# Patient Record
Sex: MALE | Race: WHITE | NOT HISPANIC OR LATINO | Employment: FULL TIME | URBAN - METROPOLITAN AREA
[De-identification: names, ages, dates, MRNs, and addresses within clinical notes are randomized per-mention and may not be internally consistent; named-entity substitution may affect disease eponyms.]

---

## 2020-11-04 ENCOUNTER — OFFICE VISIT (OUTPATIENT)
Dept: URGENT CARE | Facility: CLINIC | Age: 35
End: 2020-11-04
Payer: COMMERCIAL

## 2020-11-04 ENCOUNTER — HOSPITAL ENCOUNTER (OUTPATIENT)
Facility: MEDICAL CENTER | Age: 35
End: 2020-11-04
Attending: PHYSICIAN ASSISTANT
Payer: COMMERCIAL

## 2020-11-04 VITALS
BODY MASS INDEX: 23.49 KG/M2 | WEIGHT: 155 LBS | RESPIRATION RATE: 16 BRPM | TEMPERATURE: 98.4 F | HEART RATE: 74 BPM | DIASTOLIC BLOOD PRESSURE: 82 MMHG | HEIGHT: 68 IN | OXYGEN SATURATION: 96 % | SYSTOLIC BLOOD PRESSURE: 114 MMHG

## 2020-11-04 DIAGNOSIS — J02.9 PHARYNGITIS, UNSPECIFIED ETIOLOGY: Primary | ICD-10-CM

## 2020-11-04 DIAGNOSIS — Z20.822 CLOSE EXPOSURE TO COVID-19 VIRUS: ICD-10-CM

## 2020-11-04 LAB
INT CON NEG: NORMAL
INT CON POS: NORMAL
S PYO AG THROAT QL: NEGATIVE

## 2020-11-04 PROCEDURE — 87880 STREP A ASSAY W/OPTIC: CPT | Performed by: PHYSICIAN ASSISTANT

## 2020-11-04 PROCEDURE — 99204 OFFICE O/P NEW MOD 45 MIN: CPT | Mod: CS | Performed by: PHYSICIAN ASSISTANT

## 2020-11-04 PROCEDURE — U0003 INFECTIOUS AGENT DETECTION BY NUCLEIC ACID (DNA OR RNA); SEVERE ACUTE RESPIRATORY SYNDROME CORONAVIRUS 2 (SARS-COV-2) (CORONAVIRUS DISEASE [COVID-19]), AMPLIFIED PROBE TECHNIQUE, MAKING USE OF HIGH THROUGHPUT TECHNOLOGIES AS DESCRIBED BY CMS-2020-01-R: HCPCS

## 2020-11-04 RX ORDER — METHYLPREDNISOLONE 4 MG/1
TABLET ORAL
Qty: 21 TAB | Refills: 0 | Status: SHIPPED | OUTPATIENT
Start: 2020-11-04 | End: 2022-02-15

## 2020-11-04 NOTE — PATIENT INSTRUCTIONS
COVID-19  COVID-19 is a respiratory infection that is caused by a virus called severe acute respiratory syndrome coronavirus 2 (SARS-CoV-2). The disease is also known as coronavirus disease or novel coronavirus. In some people, the virus may not cause any symptoms. In others, it may cause a serious infection. The infection can get worse quickly and can lead to complications, such as:  · Pneumonia, or infection of the lungs.  · Acute respiratory distress syndrome or ARDS. This is fluid build-up in the lungs.  · Acute respiratory failure. This is a condition in which there is not enough oxygen passing from the lungs to the body.  · Sepsis or septic shock. This is a serious bodily reaction to an infection.  · Blood clotting problems.  · Secondary infections due to bacteria or fungus.  The virus that causes COVID-19 is contagious. This means that it can spread from person to person through droplets from coughs and sneezes (respiratory secretions).  What are the causes?  This illness is caused by a virus. You may catch the virus by:  · Breathing in droplets from an infected person's cough or sneeze.  · Touching something, like a table or a doorknob, that was exposed to the virus (contaminated) and then touching your mouth, nose, or eyes.  What increases the risk?  Risk for infection  You are more likely to be infected with this virus if you:  · Live in or travel to an area with a COVID-19 outbreak.  · Come in contact with a sick person who recently traveled to an area with a COVID-19 outbreak.  · Provide care for or live with a person who is infected with COVID-19.  Risk for serious illness  You are more likely to become seriously ill from the virus if you:  · Are 65 years of age or older.  · Have a long-term disease that lowers your body's ability to fight infection (immunocompromised).  · Live in a nursing home or long-term care facility.  · Have a long-term (chronic) disease such as:  ? Chronic lung disease, including  chronic obstructive pulmonary disease or asthma  ? Heart disease.  ? Diabetes.  ? Chronic kidney disease.  ? Liver disease.  · Are obese.  What are the signs or symptoms?  Symptoms of this condition can range from mild to severe. Symptoms may appear any time from 2 to 14 days after being exposed to the virus. They include:  · A fever.  · A cough.  · Difficulty breathing.  · Chills.  · Muscle pains.  · A sore throat.  · Loss of taste or smell.  Some people may also have stomach problems, such as nausea, vomiting, or diarrhea.  Other people may not have any symptoms of COVID-19.  How is this diagnosed?  This condition may be diagnosed based on:  · Your signs and symptoms, especially if:  ? You live in an area with a COVID-19 outbreak.  ? You recently traveled to or from an area where the virus is common.  ? You provide care for or live with a person who was diagnosed with COVID-19.  · A physical exam.  · Lab tests, which may include:  ? A nasal swab to take a sample of fluid from your nose.  ? A throat swab to take a sample of fluid from your throat.  ? A sample of mucus from your lungs (sputum).  ? Blood tests.  · Imaging tests, which may include, X-rays, CT scan, or ultrasound.  How is this treated?  At present, there is no medicine to treat COVID-19. Medicines that treat other diseases are being used on a trial basis to see if they are effective against COVID-19.  Your health care provider will talk with you about ways to treat your symptoms. For most people, the infection is mild and can be managed at home with rest, fluids, and over-the-counter medicines.  Treatment for a serious infection usually takes places in a hospital intensive care unit (ICU). It may include one or more of the following treatments. These treatments are given until your symptoms improve.  · Receiving fluids and medicines through an IV.  · Supplemental oxygen. Extra oxygen is given through a tube in the nose, a face mask, or a  chang.  · Positioning you to lie on your stomach (prone position). This makes it easier for oxygen to get into the lungs.  · Continuous positive airway pressure (CPAP) or bi-level positive airway pressure (BPAP) machine. This treatment uses mild air pressure to keep the airways open. A tube that is connected to a motor delivers oxygen to the body.  · Ventilator. This treatment moves air into and out of the lungs by using a tube that is placed in your windpipe.  · Tracheostomy. This is a procedure to create a hole in the neck so that a breathing tube can be inserted.  · Extracorporeal membrane oxygenation (ECMO). This procedure gives the lungs a chance to recover by taking over the functions of the heart and lungs. It supplies oxygen to the body and removes carbon dioxide.  Follow these instructions at home:  Lifestyle  · If you are sick, stay home except to get medical care. Your health care provider will tell you how long to stay home. Call your health care provider before you go for medical care.  · Rest at home as told by your health care provider.  · Do not use any products that contain nicotine or tobacco, such as cigarettes, e-cigarettes, and chewing tobacco. If you need help quitting, ask your health care provider.  · Return to your normal activities as told by your health care provider. Ask your health care provider what activities are safe for you.  General instructions  · Take over-the-counter and prescription medicines only as told by your health care provider.  · Drink enough fluid to keep your urine pale yellow.  · Keep all follow-up visits as told by your health care provider. This is important.  How is this prevented?    There is no vaccine to help prevent COVID-19 infection. However, there are steps you can take to protect yourself and others from this virus.  To protect yourself:   · Do not travel to areas where COVID-19 is a risk. The areas where COVID-19 is reported change often. To identify  high-risk areas and travel restrictions, check the [facility] travel website: wwwnc.cdc.gov/travel/notices  · If you live in, or must travel to, an area where COVID-19 is a risk, take precautions to avoid infection.  ? Stay away from people who are sick.  ? Wash your hands often with soap and water for 20 seconds. If soap and water are not available, use an alcohol-based hand .  ? Avoid touching your mouth, face, eyes, or nose.  ? Avoid going out in public, follow guidance from your state and local health authorities.  ? If you must go out in public, wear a cloth face covering or face mask.  ? Disinfect objects and surfaces that are frequently touched every day. This may include:  § Counters and tables.  § Doorknobs and light switches.  § Sinks and faucets.  § Electronics, such as phones, remote controls, keyboards, computers, and tablets.  To protect others:  If you have symptoms of COVID-19, take steps to prevent the virus from spreading to others.  · If you think you have a COVID-19 infection, contact your health care provider right away. Tell your health care team that you think you may have a COVID-19 infection.  · Stay home. Leave your house only to seek medical care. Do not use public transport.  · Do not travel while you are sick.  · Wash your hands often with soap and water for 20 seconds. If soap and water are not available, use alcohol-based hand .  · Stay away from other members of your household. Let healthy household members care for children and pets, if possible. If you have to care for children or pets, wash your hands often and wear a mask. If possible, stay in your own room, separate from others. Use a different bathroom.  · Make sure that all people in your household wash their hands well and often.  · Cough or sneeze into a tissue or your sleeve or elbow. Do not cough or sneeze into your hand or into the air.  · Wear a cloth face covering or face mask.  Where to find more  information  · Centers for Disease Control and Prevention: www.cdc.gov/coronavirus/2019-ncov/index.html  · World Health Organization: www.who.int/health-topics/coronavirus  Contact a health care provider if:  · You live in or have traveled to an area where COVID-19 is a risk and you have symptoms of the infection.  · You have had contact with someone who has COVID-19 and you have symptoms of the infection.  Get help right away if:  · You have trouble breathing.  · You have pain or pressure in your chest.  · You have confusion.  · You have bluish lips and fingernails.  · You have difficulty waking from sleep.  · You have symptoms that get worse.  These symptoms may represent a serious problem that is an emergency. Do not wait to see if the symptoms will go away. Get medical help right away. Call your local emergency services (911 in the [place] Do not drive yourself to the hospital. Let the emergency medical personnel know if you think you have COVID-19.  Summary  · COVID-19 is a respiratory infection that is caused by a virus. It is also known as coronavirus disease or novel coronavirus. It can cause serious infections, such as pneumonia, acute respiratory distress syndrome, acute respiratory failure, or sepsis.  · The virus that causes COVID-19 is contagious. This means that it can spread from person to person through droplets from coughs and sneezes.  · You are more likely to develop a serious illness if you are 65 years of age or older, have a weak immunity, live in a nursing home, or have chronic disease.  · There is no medicine to treat COVID-19. Your health care provider will talk with you about ways to treat your symptoms.  · Take steps to protect yourself and others from infection. Wash your hands often and disinfect objects and surfaces that are frequently touched every day. Stay away from people who are sick and wear a mask if you are sick.  This information is not intended to replace advice given to you by  your health care provider. Make sure you discuss any questions you have with your health care provider.  Document Released: 01/23/2020 Document Revised: 05/14/2020 Document Reviewed: 01/23/2020  Elsevier Patient Education © 2020 [employer]

## 2020-11-04 NOTE — PROGRESS NOTES
Subjective:      Pt is a 35 y.o. male who presents with Sore Throat (x 1 week, fatigue, diarrhea, heavy chest, headache )            HPI  This is a new problem. Pt notes sore throat, fatigue, episodes of diarrhea, mild SOB and headache x 7 days with COVID exposure to someone he hung out with for 3 days about 2 weeks ago. Pt has not taken any Rx medications for this condition. Pt states the pain is a 3/10, aching in nature and worse at night. Pt denies CP, SOB, NVD, paresthesias, headaches, dizziness, change in vision, hives, or other joint pain. The pt's medication list, problem list, and allergies have been evaluated and reviewed during today's visit.    PMH:  Negative per pt.      PSH:  Past Surgical History:   Procedure Laterality Date   • OTHER      facial surgery       Fam Hx:  the patient's family history is not pertinent to their current complaint      Soc HX:  Social History     Socioeconomic History   • Marital status: Single     Spouse name: Not on file   • Number of children: Not on file   • Years of education: Not on file   • Highest education level: Not on file   Occupational History   • Not on file   Social Needs   • Financial resource strain: Not on file   • Food insecurity     Worry: Not on file     Inability: Not on file   • Transportation needs     Medical: Not on file     Non-medical: Not on file   Tobacco Use   • Smoking status: Never Smoker   • Smokeless tobacco: Never Used   Substance and Sexual Activity   • Alcohol use: Yes     Comment: occ   • Drug use: No   • Sexual activity: Not on file   Lifestyle   • Physical activity     Days per week: Not on file     Minutes per session: Not on file   • Stress: Not on file   Relationships   • Social connections     Talks on phone: Not on file     Gets together: Not on file     Attends Yazdanism service: Not on file     Active member of club or organization: Not on file     Attends meetings of clubs or organizations: Not on file     Relationship status:  "Not on file   • Intimate partner violence     Fear of current or ex partner: Not on file     Emotionally abused: Not on file     Physically abused: Not on file     Forced sexual activity: Not on file   Other Topics Concern   • Not on file   Social History Narrative   • Not on file         Medications:    Current Outpatient Medications:   •  methylPREDNISolone (MEDROL DOSEPAK) 4 MG Tablet Therapy Pack, Follow schedule on package instructions., Disp: 21 Tab, Rfl: 0      Allergies:  Nkda [no known drug allergy]    ROS  Constitutional: Negative for fever, chills and +malaise/fatigue.   HENT: Negative for congestion and +sore throat.    Eyes: Negative for blurred vision, double vision and photophobia.   Respiratory: Negative for cough and shortness of breath.  Cardiovascular: Negative for chest pain and palpitations.   Gastrointestinal: Negative for heartburn, nausea, vomiting, abdominal pain, and constipation. +diarrhea  Genitourinary: Negative for dysuria and flank pain.   Musculoskeletal: Negative for joint pain and myalgias.   Skin: Negative for itching and rash.   Neurological: Negative for dizziness, tingling and +headaches.   Endo/Heme/Allergies: Does not bruise/bleed easily.   Psychiatric/Behavioral: Negative for depression. The patient is not nervous/anxious.           Objective:     /82   Pulse 74   Temp 36.9 °C (98.4 °F) (Temporal)   Resp 16   Ht 1.727 m (5' 8\")   Wt 70.3 kg (155 lb)   SpO2 96%   BMI 23.57 kg/m²      Physical Exam        Physical Exam   Constitutional: Pt is oriented to person, place, and time. Pt appears well-developed and well-nourished. No distress.   HENT:   Head: Normocephalic and atraumatic.   Right Ear: Hearing, tympanic membrane, external ear and ear canal normal.   Left Ear: Hearing, tympanic membrane, external ear and ear canal normal.   Nose: Mucosal edema, rhinorrhea and sinus tenderness present. No nose lacerations, nasal deformity, septal deviation or nasal septal " hematoma. No epistaxis.  No foreign bodies. Right sinus exhibits maxillary sinus tenderness and frontal sinus tenderness. Left sinus exhibits maxillary sinus tenderness and frontal sinus tenderness.   Mouth/Throat: Oropharynx is clear and moist. No oropharyngeal exudate.   Eyes: Conjunctivae normal and EOM are normal. Pupils are equal, round, and reactive to light. Right eye exhibits no discharge. Left eye exhibits no discharge.   Neck: Normal range of motion. Neck supple. No tracheal deviation present. No thyromegaly present.   Cardiovascular: Normal rate, regular rhythm, normal heart sounds and intact distal pulses.  Exam reveals no gallop and no friction rub.    No murmur heard.  Pulmonary/Chest: Effort normal and breath sounds normal. No respiratory distress. Pt has no wheezes. Pt has no rales. Pt exhibits no tenderness.   Abdominal: Soft. Bowel sounds are normal. Pt exhibits no distension and no mass. There is no tenderness. There is no rebound and no guarding.   Musculoskeletal: Normal range of motion. Pt exhibits no edema and no tenderness.   Lymphadenopathy:     Pt has no cervical adenopathy.   Neurological: Pt is alert and oriented to person, place, and time. Pt has normal reflexes. Pt displays normal reflexes. No cranial nerve deficit. Pt exhibits normal muscle tone. Coordination normal.   Skin: Skin is warm and dry. No rash noted. No erythema.   Psychiatric: Pt has a normal mood and affect. Pt behavior is normal. Judgment and thought content normal.            Assessment/Plan:        1. Pharyngitis, unspecified etiology    - POCT Rapid Strep A-->NEG  - methylPREDNISolone (MEDROL DOSEPAK) 4 MG Tablet Therapy Pack; Follow schedule on package instructions.  Dispense: 21 Tab; Refill: 0    2. Close exposure to COVID-19 virus    - COVID/SARS COV-2 PCR; Future  - methylPREDNISolone (MEDROL DOSEPAK) 4 MG Tablet Therapy Pack; Follow schedule on package instructions.  Dispense: 21 Tab; Refill: 0    As per COVID-19  Protocol:  Pt was escorted by gm CHEEK with a mask on, from their car, to an isolation room. I, as the provider, donned all appropriate, N95 mask, gloves and apparel and performed the complete exam.  Once the POC testing for STREP was determined to be NEGATIVE:  RESP COVID-19 lab placed.  Pt was instructed by me to go home and remain in quarantine with hand hygiene and airborne precautions discussed.  Pt was in full agreement and understanding of the plan and all questions were answered to the full satisfaction of the pt.  Rest, fluids encouraged.  OTC decongestant for congestion/cough  Note given for work.  AVS with medical info given.  Pt was in full understanding and agreement with the plan.  Differential diagnosis, natural history, supportive care, and indications for immediate follow-up discussed. All questions answered. Patient agrees with the plan of care.  Follow-up as needed if symptoms worsen or fail to improve to PCP, Urgent care or Emergency Room.

## 2020-11-05 DIAGNOSIS — Z20.822 CLOSE EXPOSURE TO COVID-19 VIRUS: ICD-10-CM

## 2020-11-05 LAB
COVID ORDER STATUS COVID19: NORMAL
SARS-COV-2 RNA RESP QL NAA+PROBE: NOTDETECTED
SPECIMEN SOURCE: NORMAL

## 2020-11-06 ENCOUNTER — TELEPHONE (OUTPATIENT)
Dept: URGENT CARE | Facility: CLINIC | Age: 35
End: 2020-11-06

## 2020-11-06 NOTE — TELEPHONE ENCOUNTER
Called and spoke with pt about COVID culture results which came back NEG.  Encouraged continuing the quarantine and monitor respiratory status.       Bradley Mckeon PA-C

## 2022-02-15 ENCOUNTER — OFFICE VISIT (OUTPATIENT)
Dept: MEDICAL GROUP | Facility: MEDICAL CENTER | Age: 37
End: 2022-02-15
Payer: COMMERCIAL

## 2022-02-15 VITALS
OXYGEN SATURATION: 96 % | HEART RATE: 73 BPM | TEMPERATURE: 97.6 F | SYSTOLIC BLOOD PRESSURE: 124 MMHG | WEIGHT: 152.78 LBS | DIASTOLIC BLOOD PRESSURE: 68 MMHG | BODY MASS INDEX: 23.23 KG/M2

## 2022-02-15 DIAGNOSIS — Z13.6 ENCOUNTER FOR LIPID SCREENING FOR CARDIOVASCULAR DISEASE: ICD-10-CM

## 2022-02-15 DIAGNOSIS — Z13.21 ENCOUNTER FOR VITAMIN DEFICIENCY SCREENING: ICD-10-CM

## 2022-02-15 DIAGNOSIS — Z13.1 SCREENING FOR DIABETES MELLITUS: ICD-10-CM

## 2022-02-15 DIAGNOSIS — R25.2 MUSCLE CRAMP: ICD-10-CM

## 2022-02-15 DIAGNOSIS — Z12.5 SCREENING FOR PROSTATE CANCER: ICD-10-CM

## 2022-02-15 DIAGNOSIS — Z23 IMMUNIZATION DUE: ICD-10-CM

## 2022-02-15 DIAGNOSIS — Z13.220 ENCOUNTER FOR LIPID SCREENING FOR CARDIOVASCULAR DISEASE: ICD-10-CM

## 2022-02-15 DIAGNOSIS — Z13.0 SCREENING FOR DEFICIENCY ANEMIA: ICD-10-CM

## 2022-02-15 PROCEDURE — 99213 OFFICE O/P EST LOW 20 MIN: CPT | Performed by: FAMILY MEDICINE

## 2022-02-15 RX ORDER — ALBUTEROL SULFATE 90 UG/1
AEROSOL, METERED RESPIRATORY (INHALATION)
COMMUNITY
Start: 2021-12-20 | End: 2022-02-15

## 2022-02-15 ASSESSMENT — ENCOUNTER SYMPTOMS
CONSTIPATION: 0
DIARRHEA: 0
COUGH: 0
WEIGHT LOSS: 0
SORE THROAT: 0
FEVER: 0
VOMITING: 0
CHILLS: 0
BLURRED VISION: 0
DEPRESSION: 0
WEAKNESS: 0
NAUSEA: 0
BRUISES/BLEEDS EASILY: 0
ABDOMINAL PAIN: 0
NERVOUS/ANXIOUS: 0
DIZZINESS: 0
TINGLING: 0
SHORTNESS OF BREATH: 0
DOUBLE VISION: 0
MYALGIAS: 1
PALPITATIONS: 0

## 2022-02-15 ASSESSMENT — PATIENT HEALTH QUESTIONNAIRE - PHQ9: CLINICAL INTERPRETATION OF PHQ2 SCORE: 0

## 2022-02-15 NOTE — PROGRESS NOTES
Malcolm Ramey is a pleasant 36 y.o. male here to establish care.    HPI:   Problem   Muscle Cramp    Over the last few months he has noticed some increase in muscle cramps.  States that muscle cramps will occur throughout his whole body.  Recently he was chewing on some skittles and felt muscle cramping to bilateral sides of his jaw.  States it made it difficult for him to chew.  Did note that sensation to his right posterior calf that it felt that his leg was going to cramp but never did.  Was reporting that when he was turning in bed he started to develop right sided thoracic cramping.  Takes a multivitamin daily, has increased his magnesium to 400 mg daily.  His increase the amount of water that he drinks.  He is active with running, denies any cramping after these events.  Is unable to pinpoint an exact pattern such as what triggers or what improves.  Sensations will last a few minutes then improved.          Health Maintenance  Immunization: Tdap, and Covid booster.    Current medicines (including changes today)  No current outpatient medications on file.     No current facility-administered medications for this visit.       Past Medical/ Surgical History  He  has no past medical history on file.  He  has a past surgical history that includes other (2006).    Social History  Social History     Tobacco Use   • Smoking status: Never Smoker   • Smokeless tobacco: Never Used   Vaping Use   • Vaping Use: Never used   Substance Use Topics   • Alcohol use: Yes     Comment: once a week ends   • Drug use: No     Social History     Social History Narrative   • Not on file        Family History  Family History   Problem Relation Age of Onset   • No Known Problems Mother    • Diabetes Father         type 2   • No Known Problems Sister    • Diabetes Paternal Grandmother    • Diabetes Paternal Grandfather      Family Status   Relation Name Status   • Mo  Alive   • Fa  Alive   • Sis  Alive   • PGMo  (Not Specified)   • PGFa   (Not Specified)         Review of Systems   Constitutional: Negative for chills, fever, malaise/fatigue and weight loss.   HENT: Negative for hearing loss and sore throat.    Eyes: Negative for blurred vision and double vision.   Respiratory: Negative for cough and shortness of breath.    Cardiovascular: Negative for chest pain, palpitations and leg swelling.   Gastrointestinal: Negative for abdominal pain, constipation, diarrhea, nausea and vomiting.   Musculoskeletal: Positive for myalgias.   Skin: Negative for rash.   Neurological: Negative for dizziness, tingling and weakness.   Endo/Heme/Allergies: Does not bruise/bleed easily.   Psychiatric/Behavioral: Negative for depression. The patient is not nervous/anxious.          Objective:     /68 (BP Location: Right arm, Patient Position: Sitting, BP Cuff Size: Adult)   Pulse 73   Temp 36.4 °C (97.6 °F) (Temporal)   Wt 69.3 kg (152 lb 12.5 oz)   SpO2 96%  Body mass index is 23.23 kg/m².    Physical Exam  Constitutional:       General: He is not in acute distress.  HENT:      Head: Normocephalic and atraumatic.      Right Ear: External ear normal.      Left Ear: External ear normal.   Eyes:      General: Lids are normal.      Extraocular Movements: Extraocular movements intact.      Conjunctiva/sclera: Conjunctivae normal.      Pupils: Pupils are equal, round, and reactive to light.   Neck:      Trachea: Trachea normal.   Cardiovascular:      Rate and Rhythm: Normal rate and regular rhythm.      Heart sounds: Normal heart sounds. No murmur heard.  No friction rub. No gallop.    Pulmonary:      Effort: Pulmonary effort is normal. No accessory muscle usage.      Breath sounds: Normal breath sounds. No wheezing or rales.   Abdominal:      General: Bowel sounds are normal.      Palpations: Abdomen is soft.      Tenderness: There is no abdominal tenderness.   Musculoskeletal:      Cervical back: Normal range of motion and neck supple.      Right lower leg: No  edema.      Left lower leg: No edema.   Lymphadenopathy:      Cervical: No cervical adenopathy.   Skin:     General: Skin is warm and dry.      Findings: No rash.   Neurological:      General: No focal deficit present.      Mental Status: He is alert and oriented to person, place, and time.      GCS: GCS eye subscore is 4. GCS verbal subscore is 5. GCS motor subscore is 6.      Gait: Gait is intact.      Deep Tendon Reflexes:      Reflex Scores:       Brachioradialis reflexes are 2+ on the right side and 2+ on the left side.       Patellar reflexes are 2+ on the right side and 2+ on the left side.  Psychiatric:         Attention and Perception: Attention normal.         Mood and Affect: Affect normal.         Speech: Speech normal.        Imaging:  No recent imaging to review    Labs:  Most recent labs from 03/08/2016 reviewed with patient.  Assessment and Plan:   The following treatment plan was discussed    Problem List Items Addressed This Visit     Muscle cramp     Chronic, unstable.  It is difficult to say exactly the source of his muscle cramping.  I did recommend that he make sure he is getting plenty of magnesium and other multivitamins such as potassium.  With his increasing magnesium and a daily multivitamin I do not suspect that it is related to vitamin deficiency.  I went ahead and placed some blood orders to check his basic electrolytes, anemia, and inflammatory markers.         Relevant Orders    ANTI-NUCLEAR ANTIBODY SERUM    Sed Rate    CRP QUANTITIVE (NON-CARDIAC)      Other Visit Diagnoses     Immunization due        Encounter for lipid screening for cardiovascular disease        Relevant Orders    Lipid Profile    Encounter for vitamin deficiency screening        Relevant Orders    VITAMIN D,25 HYDROXY    Screening for deficiency anemia        Relevant Orders    CBC WITH DIFFERENTIAL    Screening for diabetes mellitus        Relevant Orders    Comp Metabolic Panel    ESTIMATED GFR    HEMOGLOBIN  A1C    Screening for prostate cancer        Relevant Orders    PROSTATE SPECIFIC AG SCREENING           Records requested.  Followup: Return in about 1 year (around 2/15/2023), or if symptoms worsen or fail to improve, for Annual physical.      Please note that this dictation was created using voice recognition software. I have made every reasonable attempt to correct obvious errors, but I expect that there are errors of grammar and possibly content that I did not discover before finalizing the note.

## 2022-02-15 NOTE — ASSESSMENT & PLAN NOTE
Chronic, unstable.  It is difficult to say exactly the source of his muscle cramping.  I did recommend that he make sure he is getting plenty of magnesium and other multivitamins such as potassium.  With his increasing magnesium and a daily multivitamin I do not suspect that it is related to vitamin deficiency.  I went ahead and placed some blood orders to check his basic electrolytes, anemia, and inflammatory markers.

## 2022-04-05 ENCOUNTER — HOSPITAL ENCOUNTER (OUTPATIENT)
Dept: LAB | Facility: MEDICAL CENTER | Age: 37
End: 2022-04-05
Attending: FAMILY MEDICINE
Payer: COMMERCIAL

## 2022-04-05 DIAGNOSIS — Z13.6 ENCOUNTER FOR LIPID SCREENING FOR CARDIOVASCULAR DISEASE: ICD-10-CM

## 2022-04-05 DIAGNOSIS — Z12.5 SCREENING FOR PROSTATE CANCER: ICD-10-CM

## 2022-04-05 DIAGNOSIS — Z13.1 SCREENING FOR DIABETES MELLITUS: ICD-10-CM

## 2022-04-05 DIAGNOSIS — Z13.220 ENCOUNTER FOR LIPID SCREENING FOR CARDIOVASCULAR DISEASE: ICD-10-CM

## 2022-04-05 DIAGNOSIS — Z13.0 SCREENING FOR DEFICIENCY ANEMIA: ICD-10-CM

## 2022-04-05 DIAGNOSIS — Z13.21 ENCOUNTER FOR VITAMIN DEFICIENCY SCREENING: ICD-10-CM

## 2022-04-05 DIAGNOSIS — R25.2 MUSCLE CRAMP: ICD-10-CM

## 2022-04-05 LAB
25(OH)D3 SERPL-MCNC: 32 NG/ML (ref 30–100)
ALBUMIN SERPL BCP-MCNC: 4.8 G/DL (ref 3.2–4.9)
ALBUMIN/GLOB SERPL: 2.2 G/DL
ALP SERPL-CCNC: 79 U/L (ref 30–99)
ALT SERPL-CCNC: 36 U/L (ref 2–50)
ANION GAP SERPL CALC-SCNC: 10 MMOL/L (ref 7–16)
AST SERPL-CCNC: 24 U/L (ref 12–45)
BASOPHILS # BLD AUTO: 0.4 % (ref 0–1.8)
BASOPHILS # BLD: 0.02 K/UL (ref 0–0.12)
BILIRUB SERPL-MCNC: 0.5 MG/DL (ref 0.1–1.5)
BUN SERPL-MCNC: 10 MG/DL (ref 8–22)
CALCIUM SERPL-MCNC: 9.3 MG/DL (ref 8.4–10.2)
CHLORIDE SERPL-SCNC: 99 MMOL/L (ref 96–112)
CHOLEST SERPL-MCNC: 229 MG/DL (ref 100–199)
CO2 SERPL-SCNC: 27 MMOL/L (ref 20–33)
CREAT SERPL-MCNC: 1.05 MG/DL (ref 0.5–1.4)
CRP SERPL HS-MCNC: <0.3 MG/DL (ref 0–0.75)
EOSINOPHIL # BLD AUTO: 0.04 K/UL (ref 0–0.51)
EOSINOPHIL NFR BLD: 0.8 % (ref 0–6.9)
ERYTHROCYTE [DISTWIDTH] IN BLOOD BY AUTOMATED COUNT: 39.9 FL (ref 35.9–50)
ERYTHROCYTE [SEDIMENTATION RATE] IN BLOOD BY WESTERGREN METHOD: 3 MM/HOUR (ref 0–20)
EST. AVERAGE GLUCOSE BLD GHB EST-MCNC: 120 MG/DL
FASTING STATUS PATIENT QL REPORTED: NORMAL
GFR SERPLBLD CREATININE-BSD FMLA CKD-EPI: 94 ML/MIN/1.73 M 2
GLOBULIN SER CALC-MCNC: 2.2 G/DL (ref 1.9–3.5)
GLUCOSE SERPL-MCNC: 111 MG/DL (ref 65–99)
HBA1C MFR BLD: 5.8 % (ref 4–5.6)
HCT VFR BLD AUTO: 49.7 % (ref 42–52)
HDLC SERPL-MCNC: 46 MG/DL
HGB BLD-MCNC: 16.7 G/DL (ref 14–18)
IMM GRANULOCYTES # BLD AUTO: 0.01 K/UL (ref 0–0.11)
IMM GRANULOCYTES NFR BLD AUTO: 0.2 % (ref 0–0.9)
LDLC SERPL CALC-MCNC: 141 MG/DL
LYMPHOCYTES # BLD AUTO: 1.87 K/UL (ref 1–4.8)
LYMPHOCYTES NFR BLD: 39.2 % (ref 22–41)
MCH RBC QN AUTO: 28.3 PG (ref 27–33)
MCHC RBC AUTO-ENTMCNC: 33.6 G/DL (ref 33.7–35.3)
MCV RBC AUTO: 84.2 FL (ref 81.4–97.8)
MONOCYTES # BLD AUTO: 0.37 K/UL (ref 0–0.85)
MONOCYTES NFR BLD AUTO: 7.8 % (ref 0–13.4)
NEUTROPHILS # BLD AUTO: 2.46 K/UL (ref 1.82–7.42)
NEUTROPHILS NFR BLD: 51.6 % (ref 44–72)
NRBC # BLD AUTO: 0 K/UL
NRBC BLD-RTO: 0 /100 WBC
PLATELET # BLD AUTO: 265 K/UL (ref 164–446)
PMV BLD AUTO: 10.4 FL (ref 9–12.9)
POTASSIUM SERPL-SCNC: 3.8 MMOL/L (ref 3.6–5.5)
PROT SERPL-MCNC: 7 G/DL (ref 6–8.2)
PSA SERPL-MCNC: 1.27 NG/ML (ref 0–4)
RBC # BLD AUTO: 5.9 M/UL (ref 4.7–6.1)
SODIUM SERPL-SCNC: 136 MMOL/L (ref 135–145)
TRIGL SERPL-MCNC: 208 MG/DL (ref 0–149)
WBC # BLD AUTO: 4.8 K/UL (ref 4.8–10.8)

## 2022-04-05 PROCEDURE — 83036 HEMOGLOBIN GLYCOSYLATED A1C: CPT

## 2022-04-05 PROCEDURE — 85652 RBC SED RATE AUTOMATED: CPT

## 2022-04-05 PROCEDURE — 84153 ASSAY OF PSA TOTAL: CPT

## 2022-04-05 PROCEDURE — 80061 LIPID PANEL: CPT

## 2022-04-05 PROCEDURE — 82306 VITAMIN D 25 HYDROXY: CPT

## 2022-04-05 PROCEDURE — 86039 ANTINUCLEAR ANTIBODIES (ANA): CPT

## 2022-04-05 PROCEDURE — 86038 ANTINUCLEAR ANTIBODIES: CPT

## 2022-04-05 PROCEDURE — 80053 COMPREHEN METABOLIC PANEL: CPT

## 2022-04-05 PROCEDURE — 86140 C-REACTIVE PROTEIN: CPT

## 2022-04-05 PROCEDURE — 36415 COLL VENOUS BLD VENIPUNCTURE: CPT

## 2022-04-05 PROCEDURE — 85025 COMPLETE CBC W/AUTO DIFF WBC: CPT

## 2022-04-07 DIAGNOSIS — R76.8 POSITIVE ANA (ANTINUCLEAR ANTIBODY): ICD-10-CM

## 2022-04-07 LAB — NUCLEAR IGG SER QL IA: DETECTED

## 2022-04-08 LAB
ANA INTERPRETIVE COMMENT Q5143: ABNORMAL
ANA PATTERN Q5144: ABNORMAL
ANA TITER Q5145: ABNORMAL
ANTINUCLEAR ANTIBODY (ANA), HEP-2, IGG Q5142: DETECTED

## 2022-11-03 ENCOUNTER — PATIENT MESSAGE (OUTPATIENT)
Dept: MEDICAL GROUP | Facility: MEDICAL CENTER | Age: 37
End: 2022-11-03
Payer: COMMERCIAL

## 2022-11-03 DIAGNOSIS — R04.0 EPISTAXIS, RECURRENT: ICD-10-CM

## 2022-11-03 DIAGNOSIS — N50.89 LUMP IN SCROTUM: ICD-10-CM

## 2022-11-04 NOTE — PROGRESS NOTES
To requesting referrals to ENT due to chronic nosebleeds, and a referral to urology due to a lump that he located to his scrotum.

## 2022-11-07 ENCOUNTER — PATIENT MESSAGE (OUTPATIENT)
Dept: MEDICAL GROUP | Facility: MEDICAL CENTER | Age: 37
End: 2022-11-07
Payer: COMMERCIAL

## 2022-11-07 DIAGNOSIS — N50.89 LUMP IN SCROTUM: ICD-10-CM

## 2022-12-08 ENCOUNTER — APPOINTMENT (OUTPATIENT)
Dept: RADIOLOGY | Facility: MEDICAL CENTER | Age: 37
End: 2022-12-08
Attending: FAMILY MEDICINE
Payer: COMMERCIAL

## 2023-02-16 ENCOUNTER — OFFICE VISIT (OUTPATIENT)
Dept: MEDICAL GROUP | Facility: MEDICAL CENTER | Age: 38
End: 2023-02-16
Payer: COMMERCIAL

## 2023-02-16 VITALS
DIASTOLIC BLOOD PRESSURE: 66 MMHG | RESPIRATION RATE: 16 BRPM | HEIGHT: 68 IN | WEIGHT: 154.32 LBS | BODY MASS INDEX: 23.39 KG/M2 | OXYGEN SATURATION: 97 % | HEART RATE: 76 BPM | TEMPERATURE: 97.3 F | SYSTOLIC BLOOD PRESSURE: 108 MMHG

## 2023-02-16 DIAGNOSIS — R25.2 MUSCLE CRAMP: ICD-10-CM

## 2023-02-16 DIAGNOSIS — Z13.29 SCREENING FOR THYROID DISORDER: ICD-10-CM

## 2023-02-16 DIAGNOSIS — E78.00 PURE HYPERCHOLESTEROLEMIA: ICD-10-CM

## 2023-02-16 DIAGNOSIS — Z00.00 ENCOUNTER FOR PREVENTATIVE ADULT HEALTH CARE EXAMINATION: Primary | ICD-10-CM

## 2023-02-16 DIAGNOSIS — R73.03 PREDIABETES: ICD-10-CM

## 2023-02-16 PROCEDURE — 99395 PREV VISIT EST AGE 18-39: CPT | Performed by: FAMILY MEDICINE

## 2023-02-16 PROCEDURE — 99213 OFFICE O/P EST LOW 20 MIN: CPT | Mod: 25 | Performed by: FAMILY MEDICINE

## 2023-02-16 RX ORDER — AMOXICILLIN AND CLAVULANATE POTASSIUM 875; 125 MG/1; MG/1
1 TABLET, FILM COATED ORAL 2 TIMES DAILY
COMMUNITY
Start: 2023-01-18 | End: 2023-02-16

## 2023-02-16 RX ORDER — AZELASTINE 1 MG/ML
SPRAY, METERED NASAL
COMMUNITY
Start: 2023-01-18

## 2023-02-16 ASSESSMENT — FIBROSIS 4 INDEX: FIB4 SCORE: 0.56

## 2023-02-16 ASSESSMENT — PATIENT HEALTH QUESTIONNAIRE - PHQ9: CLINICAL INTERPRETATION OF PHQ2 SCORE: 0

## 2023-02-16 NOTE — ASSESSMENT & PLAN NOTE
New diagnosis for the patient.  Educated on the importance of eating lots of fruits and vegetables.  Limits animal fats and proteins, specifically red meat such as beef and pork.  Increasing physical activity, 140 to 150 minutes of cardio a week with heart rate goal of 120 to 130 bpm sustained during the activity.  Repeat lipid profile in 6 months

## 2023-02-16 NOTE — ASSESSMENT & PLAN NOTE
New diagnosis for the patient.  Educated on the importance of eating lots of fruits and vegetables.  Limits animal fats and proteins, specifically red meat such as beef and pork.  Increasing physical activity, 140 to 150 minutes of cardio a week with heart rate goal of 120 to 130 bpm sustained during the activity.  Repeat A1c in 6 months

## 2023-02-16 NOTE — PROGRESS NOTES
Subjective:     CC:   Chief Complaint   Patient presents with    Annual Exam       HPI:   Malcolm Ramey is a 37 y.o. male who presents for annual exam    Problem   Encounter for Preventative Adult Health Care Examination    Last Colorectal Cancer Screening: Due at 45 years  Hx STDs: No  Cholesterol Screenin2022 LDL   Diabetes Screenin2022 A1c 5.8  Hep C Screening: Due    Exercise: no regular exercise, sedentary  Diet: Regular      Urinary symptoms: none    Advanced directive: N/A   Substance Abuse: No concerns   Seat belts, bike helmet, gun safety discussed.  Sun protection used.  Routine Dental: Daily brushing and routine dental exams    Immunizations  Influenza: Up to date    HPV:  Age out   Tetanus: Due    Shingles: n/a    COVID: Due for booster  Pneumococcal : No risk factors    Hep B series: up to date, no records   Other immunizations: None      Pure Hypercholesterolemia    Reports that he eats relatively healthy.  Does not engage in any regular exercise.     Latest Reference Range & Units 22 07:58   Cholesterol,Tot 100 - 199 mg/dL 229 (H)   Triglycerides 0 - 149 mg/dL 208 (H)   HDL >=40 mg/dL 46   LDL <100 mg/dL 141 (H)   (H): Data is abnormally high     Prediabetes    Completed labs approximately 1 year ago which showed A1c at 5.8.  Eats relatively healthy though he does not engage in a routine physical exercise.  States he would like to try to your sugar levels into better control through diet and physical exercise.     Latest Reference Range & Units 22 07:58   Glycohemoglobin 4.0 - 5.6 % 5.8 (H)   Estim. Avg Glu mg/dL 120   (H): Data is abnormally high     Muscle Cramp    Over the last year has noticed some increase in muscle cramps, improves after the use of magnesium.  States that muscle cramps occur throughout the his whole body, more now to his bilateral lower extremities.  Blood work completed approximately 1 year ago to address his muscle cramps.  His ELSY did come back  positive.          He  has no past medical history on file.  He  has a past surgical history that includes other (2006).    Family History   Problem Relation Age of Onset    No Known Problems Mother     Diabetes Father         type 2    No Known Problems Sister     Diabetes Paternal Grandmother     Diabetes Paternal Grandfather      Social History     Tobacco Use    Smoking status: Never    Smokeless tobacco: Never   Vaping Use    Vaping Use: Never used   Substance Use Topics    Alcohol use: Yes     Comment: once a week ends    Drug use: No     He  reports being sexually active and has had partner(s) who are female.    Patient Active Problem List    Diagnosis Date Noted    Encounter for preventative adult health care examination 02/16/2023    Pure hypercholesterolemia 02/16/2023    Prediabetes 02/16/2023    Muscle cramp 02/15/2022     Current Outpatient Medications   Medication Sig Dispense Refill    azelastine (ASTELIN) 137 MCG/SPRAY nasal spray INSTILL 2 SPRAYS IN EACH NIOTRIL 1 TO 2 TIMES A DAY       No current facility-administered medications for this visit.     Allergies   Allergen Reactions    Nkda [No Known Drug Allergy]        Review of Systems   Constitutional: Negative for fever, chills and malaise/fatigue.   HENT: Negative for congestion.    Eyes: Negative for pain.   Respiratory: Negative for cough and shortness of breath.    Cardiovascular: Negative for chest pain and leg swelling.   Gastrointestinal: Negative for nausea, vomiting, abdominal pain and diarrhea.   Genitourinary: Negative for dysuria and hematuria.   Skin: Negative for rash.   Neurological: Negative for dizziness, focal weakness and headaches.   Endo/Heme/Allergies: Does not bruise/bleed easily.   Psychiatric/Behavioral: Negative for depression.  The patient is not nervous/anxious.      Objective:   /66 (BP Location: Left arm, Patient Position: Standing, BP Cuff Size: Adult)   Pulse 76   Temp 36.3 °C (97.3 °F) (Temporal)   Resp  "16   Ht 1.727 m (5' 8\")   Wt 70 kg (154 lb 5.2 oz)   SpO2 97%   BMI 23.46 kg/m²      Wt Readings from Last 4 Encounters:   02/16/23 70 kg (154 lb 5.2 oz)   02/15/22 69.3 kg (152 lb 12.5 oz)   11/04/20 70.3 kg (155 lb)   08/28/14 68 kg (150 lb)       Physical Exam  Constitutional:       General: He is not in acute distress.  HENT:      Head: Normocephalic and atraumatic.      Right Ear: Tympanic membrane and external ear normal.      Left Ear: Tympanic membrane and external ear normal.   Eyes:      General: Lids are normal.      Extraocular Movements: Extraocular movements intact.      Conjunctiva/sclera: Conjunctivae normal.      Pupils: Pupils are equal, round, and reactive to light.   Neck:      Trachea: Trachea normal.   Cardiovascular:      Rate and Rhythm: Normal rate and regular rhythm.      Heart sounds: Normal heart sounds. No murmur heard.    No friction rub. No gallop.   Pulmonary:      Effort: Pulmonary effort is normal. No accessory muscle usage.      Breath sounds: Normal breath sounds. No wheezing or rales.   Abdominal:      General: Bowel sounds are normal.      Palpations: Abdomen is soft.      Tenderness: There is no abdominal tenderness.   Musculoskeletal:      Cervical back: Normal range of motion and neck supple.      Right lower leg: No edema.      Left lower leg: No edema.   Lymphadenopathy:      Cervical: No cervical adenopathy.   Skin:     General: Skin is warm and dry.      Findings: No rash.   Neurological:      General: No focal deficit present.      Mental Status: He is alert and oriented to person, place, and time. Mental status is at baseline.      GCS: GCS eye subscore is 4. GCS verbal subscore is 5. GCS motor subscore is 6.      Motor: No weakness.      Gait: Gait is intact.   Psychiatric:         Attention and Perception: Attention normal.         Mood and Affect: Mood and affect normal.         Speech: Speech normal.       Imaging:  No imaging    Labs  Recent labs from " 04/05/2022 reviewed with the patient.      Assessment and Plan:        Problem List Items Addressed This Visit       Muscle cramp    Encounter for preventative adult health care examination - Primary     Anticipatory guidance:  --Discussed moderation in sodium/caffeine intake, saturated fat and cholesterol, caloric balance, sufficient fresh fruits/vegetables, and fiber.  --Discussed brushing, flossing, and dental visits.   --Encouraged regular exercise, 150 mins a week of moderate to high intensity cardiovascular activity  --Discussed tobacco, alcohol, or other drug use; availability of treatment for abuse.   --Discussed sexually transmitted infections, partner selection, use of condoms, avoidance of unintended pregnancy and contraceptive alternatives.  --Injury prevention: Discussed safety belts, safety helmets, smoke detector, etc.  -Discussed diet and exercise and colorectal cancer screening     Health maintenance: Due for hepatitis C screening  Immunizations per orders  Labs per orders         Pure hypercholesterolemia     New diagnosis for the patient.  Educated on the importance of eating lots of fruits and vegetables.  Limits animal fats and proteins, specifically red meat such as beef and pork.  Increasing physical activity, 140 to 150 minutes of cardio a week with heart rate goal of 120 to 130 bpm sustained during the activity.  Repeat lipid profile in 6 months         Prediabetes     New diagnosis for the patient.  Educated on the importance of eating lots of fruits and vegetables.  Limits animal fats and proteins, specifically red meat such as beef and pork.  Increasing physical activity, 140 to 150 minutes of cardio a week with heart rate goal of 120 to 130 bpm sustained during the activity.  Repeat A1c in 6 months          Other Visit Diagnoses       Screening for thyroid disorder        Relevant Orders    TSH WITH REFLEX TO FT4             Follow-up: Return in about 6 months (around 8/16/2023), or if  symptoms worsen or fail to improve, for Follow-up on labs.      Please note that this dictation was created using voice recognition software. I have made every reasonable attempt to correct obvious errors, but I expect that there are errors of grammar and possibly content that I did not discover before finalizing the note.

## 2023-02-16 NOTE — ASSESSMENT & PLAN NOTE
Anticipatory guidance:  --Discussed moderation in sodium/caffeine intake, saturated fat and cholesterol, caloric balance, sufficient fresh fruits/vegetables, and fiber.  --Discussed brushing, flossing, and dental visits.   --Encouraged regular exercise, 150 mins a week of moderate to high intensity cardiovascular activity  --Discussed tobacco, alcohol, or other drug use; availability of treatment for abuse.   --Discussed sexually transmitted infections, partner selection, use of condoms, avoidance of unintended pregnancy and contraceptive alternatives.  --Injury prevention: Discussed safety belts, safety helmets, smoke detector, etc.  -Discussed diet and exercise and colorectal cancer screening     Health maintenance: Due for hepatitis C screening  Immunizations per orders  Labs per orders

## 2023-03-06 ENCOUNTER — HOSPITAL ENCOUNTER (OUTPATIENT)
Dept: LAB | Facility: MEDICAL CENTER | Age: 38
End: 2023-03-06
Attending: FAMILY MEDICINE
Payer: COMMERCIAL

## 2023-03-06 DIAGNOSIS — Z13.29 SCREENING FOR THYROID DISORDER: ICD-10-CM

## 2023-03-06 DIAGNOSIS — R76.8 POSITIVE ANA (ANTINUCLEAR ANTIBODY): ICD-10-CM

## 2023-03-06 LAB
APPEARANCE UR: CLEAR
BILIRUB UR QL STRIP.AUTO: NEGATIVE
COLOR UR: YELLOW
GLUCOSE UR STRIP.AUTO-MCNC: NEGATIVE MG/DL
KETONES UR STRIP.AUTO-MCNC: NEGATIVE MG/DL
LEUKOCYTE ESTERASE UR QL STRIP.AUTO: NEGATIVE
MICRO URNS: NORMAL
NITRITE UR QL STRIP.AUTO: NEGATIVE
PH UR STRIP.AUTO: 6.5 [PH] (ref 5–8)
PROT UR QL STRIP: NEGATIVE MG/DL
RBC UR QL AUTO: NEGATIVE
SP GR UR STRIP.AUTO: <=1.005
THYROPEROXIDASE AB SERPL-ACNC: <9 IU/ML (ref 0–9)
TSH SERPL DL<=0.005 MIU/L-ACNC: 0.87 UIU/ML (ref 0.38–5.33)

## 2023-03-06 PROCEDURE — 86162 COMPLEMENT TOTAL (CH50): CPT

## 2023-03-06 PROCEDURE — 86235 NUCLEAR ANTIGEN ANTIBODY: CPT | Mod: 91

## 2023-03-06 PROCEDURE — 81003 URINALYSIS AUTO W/O SCOPE: CPT

## 2023-03-06 PROCEDURE — 84443 ASSAY THYROID STIM HORMONE: CPT

## 2023-03-06 PROCEDURE — 36415 COLL VENOUS BLD VENIPUNCTURE: CPT

## 2023-03-06 PROCEDURE — 83516 IMMUNOASSAY NONANTIBODY: CPT

## 2023-03-06 PROCEDURE — 86376 MICROSOMAL ANTIBODY EACH: CPT

## 2023-03-06 PROCEDURE — 86038 ANTINUCLEAR ANTIBODIES: CPT

## 2023-03-06 PROCEDURE — 86039 ANTINUCLEAR ANTIBODIES (ANA): CPT

## 2023-03-06 PROCEDURE — 86160 COMPLEMENT ANTIGEN: CPT | Mod: 91

## 2023-03-06 PROCEDURE — 86225 DNA ANTIBODY NATIVE: CPT

## 2023-03-06 PROCEDURE — 86800 THYROGLOBULIN ANTIBODY: CPT

## 2023-03-07 LAB
C3 SERPL-MCNC: 121.2 MG/DL (ref 87–200)
C4 SERPL-MCNC: 19.7 MG/DL (ref 19–52)
CH50 SERPL-ACNC: 62.8 U/ML (ref 38.7–89.9)
DSDNA AB TITR SER CLIF: NORMAL {TITER}
MYELOPEROXIDASE AB SER-ACNC: 0 AU/ML (ref 0–19)
NUCLEAR IGG SER QL IA: DETECTED
PROTEINASE3 AB SER-ACNC: 0 AU/ML (ref 0–19)
THYROGLOB AB SERPL-ACNC: <0.9 IU/ML (ref 0–4)

## 2023-03-08 LAB
ANA INTERPRETIVE COMMENT Q5143: ABNORMAL
ANA PATTERN Q5144: ABNORMAL
ANA TITER Q5145: ABNORMAL
ANTINUCLEAR ANTIBODY (ANA), HEP-2, IGG Q5142: DETECTED
CENTROMERE IGG TITR SER IF: 0 AU/ML (ref 0–40)
ENA JO1 AB TITR SER: 0 AU/ML (ref 0–40)
ENA SCL70 IGG SER QL: 0 AU/ML (ref 0–40)
ENA SM IGG SER-ACNC: 1 AU/ML (ref 0–40)
ENA SS-B IGG SER IA-ACNC: 0 AU/ML (ref 0–40)
RIBOSOMAL P AB SER-ACNC: 0 AU/ML (ref 0–40)
SSA52 R0ENA AB IGG Q0420: 0 AU/ML (ref 0–40)
SSA60 R0ENA AB IGG Q0419: 0 AU/ML (ref 0–40)
U1 SNRNP IGG SER QL: 2 UNITS (ref 0–19)

## 2023-03-09 DIAGNOSIS — R76.8 POSITIVE ANA (ANTINUCLEAR ANTIBODY): ICD-10-CM

## 2023-03-09 DIAGNOSIS — R25.2 MUSCLE CRAMP: ICD-10-CM

## 2023-03-09 NOTE — PROGRESS NOTES
Patient with a positive ELSY and other negative rheumatological labs.  Referral placed rheumatology for the patient.

## 2023-03-16 DIAGNOSIS — R76.8 POSITIVE ANA (ANTINUCLEAR ANTIBODY): ICD-10-CM

## 2023-08-14 ENCOUNTER — APPOINTMENT (RX ONLY)
Dept: URBAN - METROPOLITAN AREA CLINIC 4 | Facility: CLINIC | Age: 38
Setting detail: DERMATOLOGY
End: 2023-08-14

## 2023-08-14 DIAGNOSIS — L81.4 OTHER MELANIN HYPERPIGMENTATION: ICD-10-CM

## 2023-08-14 DIAGNOSIS — L72.8 OTHER FOLLICULAR CYSTS OF THE SKIN AND SUBCUTANEOUS TISSUE: ICD-10-CM

## 2023-08-14 DIAGNOSIS — D22 MELANOCYTIC NEVI: ICD-10-CM

## 2023-08-14 DIAGNOSIS — L82.1 OTHER SEBORRHEIC KERATOSIS: ICD-10-CM

## 2023-08-14 DIAGNOSIS — D18.0 HEMANGIOMA: ICD-10-CM

## 2023-08-14 DIAGNOSIS — Z71.89 OTHER SPECIFIED COUNSELING: ICD-10-CM

## 2023-08-14 PROBLEM — D22.5 MELANOCYTIC NEVI OF TRUNK: Status: ACTIVE | Noted: 2023-08-14

## 2023-08-14 PROBLEM — D18.01 HEMANGIOMA OF SKIN AND SUBCUTANEOUS TISSUE: Status: ACTIVE | Noted: 2023-08-14

## 2023-08-14 PROBLEM — D22.72 MELANOCYTIC NEVI OF LEFT LOWER LIMB, INCLUDING HIP: Status: ACTIVE | Noted: 2023-08-14

## 2023-08-14 PROBLEM — D48.5 NEOPLASM OF UNCERTAIN BEHAVIOR OF SKIN: Status: ACTIVE | Noted: 2023-08-14

## 2023-08-14 PROCEDURE — ? COUNSELING

## 2023-08-14 PROCEDURE — 11102 TANGNTL BX SKIN SINGLE LES: CPT

## 2023-08-14 PROCEDURE — 99203 OFFICE O/P NEW LOW 30 MIN: CPT | Mod: 25

## 2023-08-14 PROCEDURE — ? BIOPSY BY SHAVE METHOD

## 2023-08-14 ASSESSMENT — LOCATION SIMPLE DESCRIPTION DERM
LOCATION SIMPLE: RIGHT UPPER BACK
LOCATION SIMPLE: LEFT PLANTAR SURFACE
LOCATION SIMPLE: LOWER BACK
LOCATION SIMPLE: CHEST
LOCATION SIMPLE: LEFT LOWER BACK

## 2023-08-14 ASSESSMENT — LOCATION DETAILED DESCRIPTION DERM
LOCATION DETAILED: LEFT MEDIAL SUPERIOR CHEST
LOCATION DETAILED: RIGHT INFERIOR UPPER BACK
LOCATION DETAILED: LEFT MEDIAL PLANTAR MIDFOOT
LOCATION DETAILED: RIGHT MID-UPPER BACK
LOCATION DETAILED: LEFT SUPERIOR LATERAL MIDBACK
LOCATION DETAILED: RIGHT SUPERIOR MEDIAL UPPER BACK
LOCATION DETAILED: UPPER STERNUM
LOCATION DETAILED: SUPERIOR LUMBAR SPINE

## 2023-08-14 ASSESSMENT — LOCATION ZONE DERM
LOCATION ZONE: FEET
LOCATION ZONE: TRUNK

## 2024-06-17 ENCOUNTER — APPOINTMENT (RX ONLY)
Dept: URBAN - METROPOLITAN AREA CLINIC 4 | Facility: CLINIC | Age: 39
Setting detail: DERMATOLOGY
End: 2024-06-17

## 2024-06-17 DIAGNOSIS — L72.8 OTHER FOLLICULAR CYSTS OF THE SKIN AND SUBCUTANEOUS TISSUE: ICD-10-CM

## 2024-06-17 DIAGNOSIS — D22 MELANOCYTIC NEVI: ICD-10-CM

## 2024-06-17 DIAGNOSIS — Z71.89 OTHER SPECIFIED COUNSELING: ICD-10-CM

## 2024-06-17 PROBLEM — D22.71 MELANOCYTIC NEVI OF RIGHT LOWER LIMB, INCLUDING HIP: Status: ACTIVE | Noted: 2024-06-17

## 2024-06-17 PROBLEM — D23.22 OTHER BENIGN NEOPLASM OF SKIN OF LEFT EAR AND EXTERNAL AURICULAR CANAL: Status: ACTIVE | Noted: 2024-06-17

## 2024-06-17 PROCEDURE — ? SUNSCREEN RECOMMENDATIONS

## 2024-06-17 PROCEDURE — 99213 OFFICE O/P EST LOW 20 MIN: CPT

## 2024-06-17 PROCEDURE — ? PRESCRIPTION

## 2024-06-17 PROCEDURE — ? COUNSELING

## 2024-06-17 PROCEDURE — ? ADDITIONAL NOTES

## 2024-06-17 RX ORDER — TRIAMCINOLONE ACETONIDE 1 MG/G
CREAM TOPICAL
Qty: 15 | Refills: 0 | Status: ERX | COMMUNITY
Start: 2024-06-17

## 2024-06-17 RX ADMIN — TRIAMCINOLONE ACETONIDE: 1 CREAM TOPICAL at 00:00

## 2024-06-17 ASSESSMENT — LOCATION DETAILED DESCRIPTION DERM
LOCATION DETAILED: LEFT RADIAL DORSAL HAND
LOCATION DETAILED: LEFT INFERIOR UPPER BACK
LOCATION DETAILED: RIGHT PROXIMAL PRETIBIAL REGION
LOCATION DETAILED: RIGHT RADIAL DORSAL HAND

## 2024-06-17 ASSESSMENT — LOCATION ZONE DERM
LOCATION ZONE: HAND
LOCATION ZONE: LEG
LOCATION ZONE: TRUNK

## 2024-06-17 ASSESSMENT — LOCATION SIMPLE DESCRIPTION DERM
LOCATION SIMPLE: LEFT UPPER BACK
LOCATION SIMPLE: RIGHT HAND
LOCATION SIMPLE: LEFT HAND
LOCATION SIMPLE: RIGHT PRETIBIAL REGION

## 2024-06-17 NOTE — PROCEDURE: ADDITIONAL NOTES
Additional Notes: To contact me if this fails to resolve the irritation or if the area progresses. Recommended trying to avoid pressure on the area.
Render Risk Assessment In Note?: no
Detail Level: Simple

## 2024-09-10 ENCOUNTER — APPOINTMENT (OUTPATIENT)
Dept: MEDICAL GROUP | Facility: MEDICAL CENTER | Age: 39
End: 2024-09-10
Payer: COMMERCIAL

## 2024-09-12 ENCOUNTER — OFFICE VISIT (OUTPATIENT)
Dept: MEDICAL GROUP | Facility: MEDICAL CENTER | Age: 39
End: 2024-09-12
Payer: COMMERCIAL

## 2024-09-12 VITALS
TEMPERATURE: 98.4 F | WEIGHT: 147.71 LBS | DIASTOLIC BLOOD PRESSURE: 66 MMHG | HEART RATE: 61 BPM | SYSTOLIC BLOOD PRESSURE: 112 MMHG | BODY MASS INDEX: 22.39 KG/M2 | OXYGEN SATURATION: 97 % | HEIGHT: 68 IN

## 2024-09-12 DIAGNOSIS — R25.2 MUSCLE CRAMP: ICD-10-CM

## 2024-09-12 DIAGNOSIS — M79.10 MUSCLE PAIN: ICD-10-CM

## 2024-09-12 DIAGNOSIS — Z00.00 ENCOUNTER FOR PREVENTIVE CARE: ICD-10-CM

## 2024-09-12 DIAGNOSIS — E78.5 HYPERLIPIDEMIA, UNSPECIFIED HYPERLIPIDEMIA TYPE: ICD-10-CM

## 2024-09-12 PROCEDURE — 3078F DIAST BP <80 MM HG: CPT | Performed by: STUDENT IN AN ORGANIZED HEALTH CARE EDUCATION/TRAINING PROGRAM

## 2024-09-12 PROCEDURE — 90715 TDAP VACCINE 7 YRS/> IM: CPT | Performed by: STUDENT IN AN ORGANIZED HEALTH CARE EDUCATION/TRAINING PROGRAM

## 2024-09-12 PROCEDURE — 90472 IMMUNIZATION ADMIN EACH ADD: CPT | Performed by: STUDENT IN AN ORGANIZED HEALTH CARE EDUCATION/TRAINING PROGRAM

## 2024-09-12 PROCEDURE — 90651 9VHPV VACCINE 2/3 DOSE IM: CPT | Performed by: STUDENT IN AN ORGANIZED HEALTH CARE EDUCATION/TRAINING PROGRAM

## 2024-09-12 PROCEDURE — 90471 IMMUNIZATION ADMIN: CPT | Performed by: STUDENT IN AN ORGANIZED HEALTH CARE EDUCATION/TRAINING PROGRAM

## 2024-09-12 PROCEDURE — 3074F SYST BP LT 130 MM HG: CPT | Performed by: STUDENT IN AN ORGANIZED HEALTH CARE EDUCATION/TRAINING PROGRAM

## 2024-09-12 PROCEDURE — 99214 OFFICE O/P EST MOD 30 MIN: CPT | Mod: 25 | Performed by: STUDENT IN AN ORGANIZED HEALTH CARE EDUCATION/TRAINING PROGRAM

## 2024-09-12 ASSESSMENT — PATIENT HEALTH QUESTIONNAIRE - PHQ9: CLINICAL INTERPRETATION OF PHQ2 SCORE: 0

## 2024-09-12 NOTE — PROGRESS NOTES
"Verbal consent was acquired by the patient to use [employer] ambient listening note generation during this visit     Subjective:     HPI:   History of Present Illness  The patient is a 39-year-old male who presents for lab work.    He experiences constant leg cramps, particularly in his calves, which have improved since he started taking magnesium. The leg cramps subside when he rests and have not recurred since starting magnesium. He reports no joint pain or muscle weakness.  He was previously worked up for autoimmune disease by his PCP and found to have ELSY positive and is pending further rheumatology workup.      SOCIAL HISTORY  He owns an ambulance company.    Health Maintenance:   Tdap booster administered today    Objective:     Exam:  /66   Pulse 61   Temp 36.9 °C (98.4 °F) (Temporal)   Ht 1.727 m (5' 8\")   Wt 67 kg (147 lb 11.3 oz)   SpO2 97%   BMI 22.46 kg/m²  Body mass index is 22.46 kg/m².    Physical Exam  Constitutional:       Appearance: Normal appearance.   Cardiovascular:      Pulses: Normal pulses.      Heart sounds: Normal heart sounds. No murmur heard.  Pulmonary:      Effort: Pulmonary effort is normal. No respiratory distress.      Breath sounds: Normal breath sounds.   Musculoskeletal:         General: No swelling, tenderness or deformity. Normal range of motion.   Skin:     General: Skin is warm and dry.   Neurological:      Mental Status: He is alert and oriented to person, place, and time.           Lab studies  Lab Results   Component Value Date/Time    SODIUM 136 04/05/2022 07:58 AM    POTASSIUM 3.8 04/05/2022 07:58 AM    CHLORIDE 99 04/05/2022 07:58 AM    CO2 27 04/05/2022 07:58 AM    GLUCOSE 111 (H) 04/05/2022 07:58 AM    BUN 10 04/05/2022 07:58 AM    CREATININE 1.05 04/05/2022 07:58 AM          Assessment & Plan:     Assessment & Plan      Problem List Items Addressed This Visit       Muscle cramp     - Obtain repeat ELSY  - Add CK and aldolase level to evaluate for " myositis         Relevant Orders    CREATINE KINASE    ALDOLASE    ELSY ANTIBODY WITH REFLEX     Other Visit Diagnoses       Encounter for preventive care        Relevant Orders    Tdap Vaccine =>8YO IM (Completed)    9VHPV Vaccine 2-3 Dose IM (Completed)    Muscle pain        Hyperlipidemia, unspecified hyperlipidemia type        Relevant Orders    Lipid Profile                Return if symptoms worsen or fail to improve, for Labfollowup.    Please note that this dictation was created using voice recognition software. I have made every reasonable attempt to correct obvious errors, but I expect that there are errors of grammar and possibly content that I did not discover before finalizing the note.

## 2024-09-18 ENCOUNTER — OFFICE VISIT (OUTPATIENT)
Dept: URGENT CARE | Facility: CLINIC | Age: 39
End: 2024-09-18
Payer: COMMERCIAL

## 2024-09-18 VITALS
HEART RATE: 70 BPM | HEIGHT: 68 IN | BODY MASS INDEX: 22.58 KG/M2 | OXYGEN SATURATION: 99 % | WEIGHT: 149 LBS | RESPIRATION RATE: 20 BRPM | DIASTOLIC BLOOD PRESSURE: 78 MMHG | SYSTOLIC BLOOD PRESSURE: 118 MMHG | TEMPERATURE: 99.1 F

## 2024-09-18 DIAGNOSIS — J06.9 VIRAL URI WITH COUGH: ICD-10-CM

## 2024-09-18 LAB
FLUAV RNA SPEC QL NAA+PROBE: NEGATIVE
FLUBV RNA SPEC QL NAA+PROBE: NEGATIVE
RSV RNA SPEC QL NAA+PROBE: NEGATIVE
SARS-COV-2 RNA RESP QL NAA+PROBE: NEGATIVE

## 2024-09-18 PROCEDURE — 3078F DIAST BP <80 MM HG: CPT | Performed by: STUDENT IN AN ORGANIZED HEALTH CARE EDUCATION/TRAINING PROGRAM

## 2024-09-18 PROCEDURE — 0241U POCT CEPHEID COV-2, FLU A/B, RSV - PCR: CPT | Performed by: STUDENT IN AN ORGANIZED HEALTH CARE EDUCATION/TRAINING PROGRAM

## 2024-09-18 PROCEDURE — 99213 OFFICE O/P EST LOW 20 MIN: CPT | Performed by: STUDENT IN AN ORGANIZED HEALTH CARE EDUCATION/TRAINING PROGRAM

## 2024-09-18 PROCEDURE — 3074F SYST BP LT 130 MM HG: CPT | Performed by: STUDENT IN AN ORGANIZED HEALTH CARE EDUCATION/TRAINING PROGRAM

## 2024-09-18 NOTE — PROGRESS NOTES
"Subjective:   Malcolm Ramey is a 39 y.o. male who presents for Covid-19 Home Monitoring Video Visit (Symptoms 4 days )      HPI:  39-year-old male presents to urgent care for 4 to 5 days of nasal congestion, runny nose, postnasal drip, productive cough, sore throat, fatigue, body aches, chills, and fever.  States the fever has broke over the past 24 hours.  No one else at home with similar symptoms.  Not experiencing any chest pressure.  Does have discomfort with coughing.  No nausea, vomiting, ear pain, dizziness.    Medications:    azelastine    Allergies: Nkda [no known drug allergy]    Problem List: Malcolm Ramey does not have any pertinent problems on file.    Surgical History:  Past Surgical History:   Procedure Laterality Date    OTHER  2006    facial surgery       Past Social Hx: Malcolm Ramey  reports that he has never smoked. He has never used smokeless tobacco. He reports current alcohol use. He reports that he does not use drugs.     Past Family Hx:  Malcolm Ramey family history includes Diabetes in his father, paternal grandfather, and paternal grandmother; No Known Problems in his mother and sister.     Problem list, medications, and allergies reviewed by myself today in Epic.     Objective:     /78   Pulse 70   Temp 37.3 °C (99.1 °F) (Temporal)   Resp 20   Ht 1.727 m (5' 8\")   Wt 67.6 kg (149 lb)   SpO2 99%   BMI 22.66 kg/m²     Physical Exam  Vitals reviewed.   Constitutional:       Appearance: He is not toxic-appearing.   HENT:      Nose: Congestion present.      Mouth/Throat:      Mouth: Mucous membranes are moist.      Pharynx: Posterior oropharyngeal erythema present. No oropharyngeal exudate.   Cardiovascular:      Rate and Rhythm: Normal rate and regular rhythm.      Pulses: Normal pulses.      Heart sounds: Normal heart sounds. No murmur heard.     No friction rub.   Pulmonary:      Effort: Pulmonary effort is normal. No respiratory distress.      Breath sounds: Normal " breath sounds. No stridor. No wheezing, rhonchi or rales.         Lab Results/POC Test Results   Results for orders placed or performed in visit on 09/18/24   POCT CoV-2, Flu A/B, RSV by PCR   Result Value Ref Range    SARS-CoV-2 by PCR Negative Negative, Invalid    Influenza virus A RNA Negative Negative, Invalid    Influenza virus B, PCR Negative Negative, Invalid    RSV, PCR Negative Negative, Invalid           Assessment/Plan:     Diagnosis and associated orders:     1. Viral URI with cough  POCT CoV-2, Flu A/B, RSV by PCR         Comments/MDM:     Pulmonary exam shows no wheezing, rales, rhonchi.  Patient does report worsening productive cough.  Discussed x-ray imaging today but patient declined.  Given constellation of symptoms, likely viral etiology.  Patient requesting viral testing at this time.  Viral test conducted.  Discussed home supportive care.  Discussed typical timeframe for resolution of symptoms.  Vitals are stable.  Strict return precautions given.  Viral testing negative today.         Differential diagnosis, natural history, supportive care, and indications for immediate follow-up discussed.    Advised the patient to follow-up with the primary care physician for recheck, reevaluation, and consideration of further management.    Please note that this dictation was created using voice recognition software. I have made a reasonable attempt to correct obvious errors, but I expect that there are errors of grammar and possibly content that I did not discover before finalizing the note.    Electronically signed by Bill Leon PA-C.

## 2024-09-21 DIAGNOSIS — J01.40 ACUTE NON-RECURRENT PANSINUSITIS: ICD-10-CM

## 2025-06-06 ENCOUNTER — OFFICE VISIT (OUTPATIENT)
Dept: DERMATOLOGY | Facility: IMAGING CENTER | Age: 40
End: 2025-06-06
Payer: COMMERCIAL

## 2025-06-06 DIAGNOSIS — L72.3 SEBACEOUS CYST: Primary | ICD-10-CM

## 2025-06-06 DIAGNOSIS — L98.9 ENLARGING SKIN LESION: ICD-10-CM

## 2025-06-06 PROCEDURE — 99203 OFFICE O/P NEW LOW 30 MIN: CPT | Performed by: DERMATOLOGY

## 2025-06-06 NOTE — PROGRESS NOTES
CC: Establish Care and Skin Lesion       Subjective:New patient here for skin lesion on lt middle back     HPI/location: Lt middle back   Time present: 1 year   Painful lesion: No  Itching lesion: No  Enlarging lesion: Yes  Anything make it better or worse?    History of skin cancer: No  History of precancers/actinic keratoses: No  History of biopsies:No  History of blistering/severe sunburns:No  Family history of skin cancer:No  Family history of atypical moles:No      ROS: no fevers/chills. No itch.  No cough  Relevant PMH:NC  Social: NS    PE: Gen:WDWN male in NAD. Skin: focal exam 1cm subcut nodule left mid back, appearing to have central punctum    A/P: sebaceous cyst: back  -reviewed dx/tx  -reviewed indications and R/b/a of excision to treat  -to schedule PRN    I have reviewed medications relevant to my specialty.

## 2025-06-26 ENCOUNTER — APPOINTMENT (OUTPATIENT)
Dept: DERMATOLOGY | Facility: IMAGING CENTER | Age: 40
End: 2025-06-26
Payer: COMMERCIAL

## 2025-07-17 ENCOUNTER — APPOINTMENT (OUTPATIENT)
Dept: DERMATOLOGY | Facility: IMAGING CENTER | Age: 40
End: 2025-07-17
Payer: COMMERCIAL

## 2025-07-17 VITALS — TEMPERATURE: 97.7 F | HEIGHT: 68 IN | BODY MASS INDEX: 23.49 KG/M2 | WEIGHT: 155 LBS

## 2025-07-17 DIAGNOSIS — L98.9 ENLARGING SKIN LESION: ICD-10-CM

## 2025-07-17 DIAGNOSIS — L72.3 SEBACEOUS CYST: Primary | ICD-10-CM

## 2025-07-17 PROCEDURE — 11401 EXC TR-EXT B9+MARG 0.6-1 CM: CPT | Performed by: DERMATOLOGY

## 2025-07-17 PROCEDURE — 12032 INTMD RPR S/A/T/EXT 2.6-7.5: CPT | Performed by: DERMATOLOGY

## 2025-07-17 NOTE — PROGRESS NOTES
"BENIGN EXCISION PROCEDURE NOTE:    Risks, benefits and alternatives of procedure, including, but not limited to scar/poor cosmetic outcome, bleeding, pain, infection, nerve damage, recurrence of lesion, failed surgery, and need for further surgery, were discussed and written informed consent obtained. Verbal time out completed.     Allergies reviewed: Yes  Pacemaker/defibrillator: No  Artificial joints: No  Antibiotics given: No  Blood thinners/anticoagulants: No    Pre-op diagnosis: cyst/R20.8 (disturbance of skin)  Post-op diagnosis: Same  Indication: symptomatic (enlarging)    Site:back   Pre-op size:1cm  Post-op antibiotics: no    Temp 36.5 °C (97.7 °F)   Ht 1.727 m (5' 8\")   Wt 70.3 kg (155 lb)     Procedure: Area of surgery was prepped with alcohol, marked with a sterile marking pen. Anesthesia with 1% lidocaine with epinephrine administered with 30 gauge needle. The area was again cleaned with chlorhexidine swab. With sterile technique, a 15 blade scalpel was used to make a elliptical incision over the lesion to the level of the subcutaneous fat. Dissection was completed with iris/tissue scissors, and the mass was removed. Hemostasis was achieved with electrodessication.  Specimen was placed into biopsy container and sent to pathology by staff.    Intermediate closure note:  3.0/4.0 monocryl buried vertical mattress sutures were placed to close dead space. 4.0 prolene superficial running sutures were placed to approximate wound edges.  Vaseline applied to wound with bandage. Patient tolerated procedure well and there were no complications, blood loss was minimal.     Final wound size: 3cm    Bandage was placed with vaseline, telfa, gauze and tape. Wound care was discussed with the patient, and written instructions were provided. Patient to have wife assist with suture removal 10-14 days. Patient to call us if any problems or concerns with the procedure site arise prior to scheduled appointment.     Radha" MD [facility]

## 2025-07-29 ENCOUNTER — APPOINTMENT (OUTPATIENT)
Dept: DERMATOLOGY | Facility: IMAGING CENTER | Age: 40
End: 2025-07-29
Payer: COMMERCIAL